# Patient Record
Sex: FEMALE | Race: WHITE | URBAN - METROPOLITAN AREA
[De-identification: names, ages, dates, MRNs, and addresses within clinical notes are randomized per-mention and may not be internally consistent; named-entity substitution may affect disease eponyms.]

---

## 2020-02-19 ENCOUNTER — OFFICE VISIT (OUTPATIENT)
Dept: URBAN - METROPOLITAN AREA CLINIC 11 | Facility: CLINIC | Age: 60
End: 2020-02-19
Payer: COMMERCIAL

## 2020-02-19 DIAGNOSIS — H25.13 AGE-RELATED NUCLEAR CATARACT, BILATERAL: ICD-10-CM

## 2020-02-19 PROCEDURE — 92004 COMPRE OPH EXAM NEW PT 1/>: CPT | Performed by: OPTOMETRIST

## 2020-02-19 ASSESSMENT — KERATOMETRY
OS: 43.13
OD: 44.00

## 2020-02-19 ASSESSMENT — INTRAOCULAR PRESSURE
OD: 16
OS: 17

## 2020-02-19 NOTE — IMPRESSION/PLAN
Impression: Type 2 diabetes mellitus w/o complication: Z12.5. Plan: No signs of retinopathy or neovascularization noted. Discussed ocular and systemic benefits of blood sugar control.  RTC 1yr complete exam

## 2020-02-19 NOTE — IMPRESSION/PLAN
Impression: Age-related nuclear cataract, bilateral: H25.13. Plan: Discussed diagnosis in detail with patient. No treatment is required at this time. Will continue to observe condition and or symptoms. Call if [address] worsens.

## 2021-04-14 ENCOUNTER — OFFICE VISIT (OUTPATIENT)
Dept: URBAN - METROPOLITAN AREA CLINIC 11 | Facility: CLINIC | Age: 61
End: 2021-04-14
Payer: COMMERCIAL

## 2021-04-14 DIAGNOSIS — E11.9 TYPE 2 DIABETES MELLITUS W/O COMPLICATION: Primary | ICD-10-CM

## 2021-04-14 DIAGNOSIS — H40.033 ANATOMICAL NARROW ANGLE, BILATERAL: ICD-10-CM

## 2021-04-14 PROCEDURE — 92250 FUNDUS PHOTOGRAPHY W/I&R: CPT | Performed by: OPTOMETRIST

## 2021-04-14 PROCEDURE — 99214 OFFICE O/P EST MOD 30 MIN: CPT | Performed by: OPTOMETRIST

## 2021-04-14 PROCEDURE — 92020 GONIOSCOPY: CPT | Performed by: OPTOMETRIST

## 2021-04-14 ASSESSMENT — KERATOMETRY
OS: 43.25
OD: 43.25

## 2021-04-14 ASSESSMENT — INTRAOCULAR PRESSURE
OS: 15
OD: 15

## 2021-04-14 NOTE — IMPRESSION/PLAN
Impression: Type 2 diabetes mellitus w/o complication: I89.4. Plan: Continue BS control. Pt not dilated today because of risk of angle closure.  RTC in 3mns for dilation after PI.

## 2021-04-14 NOTE — IMPRESSION/PLAN
Impression: Anatomical narrow angle, bilateral: H40.033. Plan: Gonio ordered and performed today OU. Photos [address] 73 ordered and preformed today. Angles appear occludable OU.  Refer to MD for PI eval. s/s of angle closure call office or go to ER

## 2021-05-05 ENCOUNTER — OFFICE VISIT (OUTPATIENT)
Dept: URBAN - METROPOLITAN AREA CLINIC 11 | Facility: CLINIC | Age: 61
End: 2021-05-05
Payer: COMMERCIAL

## 2021-05-05 DIAGNOSIS — H11.153 PINGUECULA, BILATERAL: ICD-10-CM

## 2021-05-05 PROCEDURE — 92133 CPTRZD OPH DX IMG PST SGM ON: CPT | Performed by: OPHTHALMOLOGY

## 2021-05-05 PROCEDURE — 76514 ECHO EXAM OF EYE THICKNESS: CPT | Performed by: OPHTHALMOLOGY

## 2021-05-05 PROCEDURE — 92020 GONIOSCOPY: CPT | Performed by: OPHTHALMOLOGY

## 2021-05-05 PROCEDURE — 99204 OFFICE O/P NEW MOD 45 MIN: CPT | Performed by: OPHTHALMOLOGY

## 2021-05-05 RX ORDER — PREDNISOLONE ACETATE 10 MG/ML
1 % SUSPENSION/ DROPS OPHTHALMIC
Qty: 1 | Refills: 0 | Status: INACTIVE
Start: 2021-05-05 | End: 2021-08-11

## 2021-05-05 ASSESSMENT — INTRAOCULAR PRESSURE
OS: 15
OD: 16

## 2021-05-05 NOTE — IMPRESSION/PLAN
Impression: Anatomical narrow angle, bilateral: H40.033. /566, 5/21 /102 8/8, GCC 77/77 Plan: ok for LPI OU in [address], RL2. Discussed narrow angles, risk of angle closure, symptoms of AACG and Laser peripheral iridotomy (LPI) risk/benefits/alternatives. Discussed that pt should avoid dilation and anti-depression, anti-anxiety, anti-histamine, or other medications contraindicated in angle closure glaucoma until the laser has been performed. Discussed risk of irreversible vision loss with glaucoma. Pt voiced understanding.

## 2021-05-05 NOTE — IMPRESSION/PLAN
Impression: Pinguecula, bilateral: H11.153. Plan: Discussed diagnosis in detail with patient. Advised patient of condition. No treatment is required at this time. Will continue to observe condition and or symptoms.

## 2021-07-07 ENCOUNTER — SURGERY (OUTPATIENT)
Dept: URBAN - METROPOLITAN AREA SURGERY 20 | Facility: SURGERY | Age: 61
End: 2021-07-07
Payer: COMMERCIAL

## 2021-07-14 ENCOUNTER — POST-OPERATIVE VISIT (OUTPATIENT)
Dept: URBAN - METROPOLITAN AREA CLINIC 11 | Facility: CLINIC | Age: 61
End: 2021-07-14
Payer: COMMERCIAL

## 2021-07-14 DIAGNOSIS — Z48.810 ENCOUNTER FOR SURGICAL AFTERCARE FOLLOWING SURGERY ON A SENSE ORGAN: Primary | ICD-10-CM

## 2021-07-14 PROCEDURE — 99024 POSTOP FOLLOW-UP VISIT: CPT | Performed by: OPTOMETRIST

## 2021-07-14 ASSESSMENT — INTRAOCULAR PRESSURE
OD: 16
OS: 17

## 2021-07-14 NOTE — IMPRESSION/PLAN
Impression: S/P LPI (Laser Peripheral Iridotomy) OU - 7 Days. Encounter for surgical aftercare following surgery on a sense organ  Z48.810. Plan: monitor RTC 1-2 mns DE for DM --Discontinue Prednisolone acetate 1%

## 2021-08-11 ENCOUNTER — OFFICE VISIT (OUTPATIENT)
Dept: URBAN - METROPOLITAN AREA CLINIC 11 | Facility: CLINIC | Age: 61
End: 2021-08-11
Payer: COMMERCIAL

## 2021-08-11 PROCEDURE — 92014 COMPRE OPH EXAM EST PT 1/>: CPT | Performed by: OPTOMETRIST

## 2021-08-11 ASSESSMENT — INTRAOCULAR PRESSURE
OD: 18
OS: 18

## 2021-08-11 NOTE — IMPRESSION/PLAN
Impression: Type 2 diabetes mellitus w/o complication: Z39.7. Plan: No signs of retinopathy or neovascularization noted. Discussed ocular and systemic benefits of blood sugar control. Continue blood sugar control.  RTC 1yr complete exam

## 2022-08-10 ENCOUNTER — OFFICE VISIT (OUTPATIENT)
Facility: LOCATION | Age: 62
End: 2022-08-10
Payer: COMMERCIAL

## 2022-08-10 DIAGNOSIS — H43.811 VITREOUS DEGENERATION, RIGHT EYE: ICD-10-CM

## 2022-08-10 DIAGNOSIS — H25.13 AGE-RELATED NUCLEAR CATARACT, BILATERAL: ICD-10-CM

## 2022-08-10 DIAGNOSIS — E11.9 TYPE 2 DIABETES MELLITUS WITHOUT COMPLICATIONS: Primary | ICD-10-CM

## 2022-08-10 PROCEDURE — 99214 OFFICE O/P EST MOD 30 MIN: CPT | Performed by: OPTOMETRIST

## 2022-08-10 PROCEDURE — 92134 CPTRZ OPH DX IMG PST SGM RTA: CPT | Performed by: OPTOMETRIST

## 2022-08-10 ASSESSMENT — KERATOMETRY
OD: 42.88
OS: 43.63

## 2022-08-10 ASSESSMENT — INTRAOCULAR PRESSURE
OS: 15
OD: 15

## 2022-08-10 NOTE — IMPRESSION/PLAN
Impression: Type 2 diabetes mellitus without complications: X68.3. Plan: Diabetes type II: no background retinopathy, no signs of neovascularization noted. Discussed ocular and systemic benefits of blood sugar control.